# Patient Record
Sex: FEMALE | Race: WHITE | NOT HISPANIC OR LATINO | Employment: UNEMPLOYED | URBAN - METROPOLITAN AREA
[De-identification: names, ages, dates, MRNs, and addresses within clinical notes are randomized per-mention and may not be internally consistent; named-entity substitution may affect disease eponyms.]

---

## 2022-01-27 ENCOUNTER — HISTORICAL (OUTPATIENT)
Dept: ADMINISTRATIVE | Facility: HOSPITAL | Age: 62
End: 2022-01-27

## 2022-01-29 ENCOUNTER — HISTORICAL (OUTPATIENT)
Dept: ADMINISTRATIVE | Facility: HOSPITAL | Age: 62
End: 2022-01-29

## 2022-02-03 ENCOUNTER — TELEPHONE (OUTPATIENT)
Dept: NEUROLOGY | Facility: CLINIC | Age: 62
End: 2022-02-03
Payer: MEDICAID

## 2022-02-03 NOTE — TELEPHONE ENCOUNTER
Called and spoke with pts dtr Bárbara. Bárbara stated she is wanting to get pt transferred to [facility] for the hospital in [place], [place]. Explained to dtr she would need to tell the attending physician caring for pt you request a transfer. That physician would need to initiate the transfer. Dtr voiced understanding.

## 2022-02-03 NOTE — TELEPHONE ENCOUNTER
----- Message from Cathryn Cruz sent at 2/3/2022  7:54 AM CST -----  Regarding: Pt Inquiry  Regarding:Pts daughter called about speaking to department on questions she has for transferring pt to main campus for seizures and pt is now in hospital in [place], [place]    Call back number:[phone] Bárbara

## 2022-02-21 ENCOUNTER — HISTORICAL (OUTPATIENT)
Dept: ADMINISTRATIVE | Facility: HOSPITAL | Age: 62
End: 2022-02-21

## 2022-02-21 LAB
ABS NEUT (OLG): 8.63
ALBUMIN SERPL-MCNC: 3.1 G/DL (ref 3.4–4.8)
ALBUMIN/GLOB SERPL: 0.7 {RATIO} (ref 1.1–2)
ALP SERPL-CCNC: 70 U/L (ref 40–150)
ALT SERPL-CCNC: 51 U/L (ref 0–55)
AST SERPL-CCNC: 55 U/L (ref 5–34)
BASOPHILS # BLD AUTO: 0.07 10*3/UL
BASOPHILS NFR BLD AUTO: 0.6 %
BILIRUB SERPL-MCNC: 0.2 MG/DL
BILIRUBIN DIRECT+TOT PNL SERPL-MCNC: 0
BILIRUBIN DIRECT+TOT PNL SERPL-MCNC: 0.2 (ref 0–0.5)
BUN SERPL-MCNC: 48 MG/DL (ref 9.8–20.1)
CALCIUM SERPL-MCNC: 10.1 MG/DL (ref 8.7–10.5)
CHLORIDE SERPL-SCNC: 101 MMOL/L (ref 98–107)
CHOLEST SERPL-MCNC: 151 MG/DL
CHOLEST/HDLC SERPL: 2 {RATIO} (ref 0–5)
CO2 SERPL-SCNC: 23 MMOL/L (ref 23–31)
CREAT SERPL-MCNC: 1.24 MG/DL (ref 0.55–1.02)
DEPRECATED CALCIDIOL+CALCIFEROL SERPL-MC: 25.4 NG/ML (ref 30–80)
EOSINOPHIL # BLD AUTO: 0.15 10*3/UL
EOSINOPHIL NFR BLD AUTO: 1.3 %
ERYTHROCYTE [DISTWIDTH] IN BLOOD BY AUTOMATED COUNT: 13 %
EST. AVERAGE GLUCOSE BLD GHB EST-MCNC: 111 MG/DL
GLOBULIN SER-MCNC: 4.6 G/DL (ref 2.4–3.5)
GLUCOSE SERPL-MCNC: 131 MG/DL (ref 82–115)
HBA1C MFR BLD: 5.5 % (ref 4–6)
HCT VFR BLD AUTO: 37.9 % (ref 34–46)
HDLC SERPL-MCNC: 63 MG/DL (ref 35–60)
HEMOLYSIS INTERF INDEX SERPL-ACNC: -2
HGB BLD-MCNC: 11.7 G/DL (ref 11.3–15.4)
ICTERIC INTERF INDEX SERPL-ACNC: 0
IMM GRANULOCYTES # BLD AUTO: 0.04 10*3/UL (ref 0–0.1)
IMM GRANULOCYTES NFR BLD AUTO: 0.3 % (ref 0–1)
LDLC SERPL CALC-MCNC: 69 MG/DL (ref 50–140)
LIPEMIC INTERF INDEX SERPL-ACNC: 2
LYMPHOCYTES # BLD AUTO: 1.95 10*3/UL
LYMPHOCYTES NFR BLD AUTO: 16.5 %
MANUAL DIFF? (OHS): NO
MCH RBC QN AUTO: 25.7 PG (ref 27–33)
MCHC RBC AUTO-ENTMCNC: 30.9 G/DL (ref 32–35)
MCV RBC AUTO: 83.3 FL (ref 81–97)
MONOCYTES # BLD AUTO: 0.99 10*3/UL
MONOCYTES NFR BLD AUTO: 8.4 %
NEUTROPHILS # BLD AUTO: 8.63 10*3/UL
NEUTROPHILS NFR BLD AUTO: 72.9 %
PLATELET # BLD AUTO: 445 10*3/UL (ref 140–450)
PMV BLD AUTO: 10 FL
POTASSIUM SERPL-SCNC: 4.3 MMOL/L (ref 3.5–5.1)
PROT SERPL-MCNC: 7.7 G/DL (ref 5.8–7.6)
RBC # BLD AUTO: 4.55 10*6/UL (ref 3.9–5)
SODIUM SERPL-SCNC: 138 MMOL/L (ref 136–145)
TRIGL SERPL-MCNC: 95 MG/DL (ref 37–140)
TSH SERPL-ACNC: 1 M[IU]/L (ref 0.35–4.94)
VLDLC SERPL CALC-MCNC: 19 MG/DL
WBC # SPEC AUTO: 11.83 10*3/UL (ref 3.4–9.2)

## 2022-06-13 ENCOUNTER — TELEPHONE (OUTPATIENT)
Dept: NEUROLOGY | Facility: CLINIC | Age: 62
End: 2022-06-13

## 2022-06-13 NOTE — TELEPHONE ENCOUNTER
Called and left a message for  regarding her appt with . I stated that  will not be in clinic that day and we have to reschedule her appt. I stated that to give us a call back regarding this matter